# Patient Record
Sex: FEMALE | URBAN - METROPOLITAN AREA
[De-identification: names, ages, dates, MRNs, and addresses within clinical notes are randomized per-mention and may not be internally consistent; named-entity substitution may affect disease eponyms.]

---

## 2017-01-13 ENCOUNTER — TELEPHONE (OUTPATIENT)
Dept: VASCULAR SURGERY | Facility: CLINIC | Age: 57
End: 2017-01-13

## 2017-01-13 NOTE — TELEPHONE ENCOUNTER
----- Message from Tigist Trevino sent at 1/13/2017  1:17 PM CST -----  Contact: Self   Marilee States that they need a call returned by a nurse in reference to her still being in pain and she wants some medicine called in.  Please call Marilee @ [phone]                                 . Thanks :)